# Patient Record
Sex: FEMALE | Race: BLACK OR AFRICAN AMERICAN
[De-identification: names, ages, dates, MRNs, and addresses within clinical notes are randomized per-mention and may not be internally consistent; named-entity substitution may affect disease eponyms.]

---

## 2018-06-08 ENCOUNTER — HOSPITAL ENCOUNTER (EMERGENCY)
Dept: HOSPITAL 76 - ED | Age: 28
Discharge: HOME | End: 2018-06-08
Payer: COMMERCIAL

## 2018-06-08 VITALS — DIASTOLIC BLOOD PRESSURE: 75 MMHG | SYSTOLIC BLOOD PRESSURE: 104 MMHG

## 2018-06-08 DIAGNOSIS — L02.215: Primary | ICD-10-CM

## 2018-06-08 PROCEDURE — 99283 EMERGENCY DEPT VISIT LOW MDM: CPT

## 2018-06-08 NOTE — ED PHYSICIAN DOCUMENTATION
PD HPI SKIN





- Stated complaint


Stated Complaint: SORE ON BOTTOM





- Chief complaint


Chief Complaint: Wound





- History obtained from


History obtained from: Patient





- History of Present Illness


Timing - onset: Yesterday


Timing - details: Gradual onset


Location: Other (perineum)


Quality / character: Painful


Similar symptoms before: Diagnosis (Staph infection (boil).)





- Additional information


Additional information: 


The patient is a 28-year-old female who presents with a sore in her perineal 

region.  She first noticed it yesterday, and it become worse today.  She has a 

history of staph skin infections, which she states have been tested in the past 

and have been negative for MRSA.  She denies fever, dysuria, or abdominal 

symptoms.








Review of Systems


Constitutional: denies: Fever


Nose: denies: Congestion


Throat: denies: Sore throat


Respiratory: denies: Dyspnea


GI: denies: Abdominal Pain, Nausea, Vomiting


: denies: Dysuria


Skin: reports: Lesions (Right perineum).  denies: Rash


Musculoskeletal: denies: Back pain





PD PAST MEDICAL HISTORY





- Past Medical History


Past Medical History: No





- Past Surgical History


Past Surgical History: Yes


/GYN:  section





- Present Medications


Home Medications: 


 Ambulatory Orders











 Medication  Instructions  Recorded  Confirmed


 


Fluconazole [Diflucan] 150 mg PO ONCE #1 tablet 18 


 


cephALEXin [Cephalexin] 500 mg PO QID #28 tablet 18 














- Allergies


Allergies/Adverse Reactions: 


 Allergies











Allergy/AdvReac Type Severity Reaction Status Date / Time


 


No Known Drug Allergies Allergy   Verified 18 15:21














- Social History


Does the pt smoke?: No


Smoking Status: Never smoker





- Immunizations


Immunizations are current?: Yes





PD ED PE NORMAL





- Vitals


Vital signs reviewed: Yes (Initially hypertensive.)





- General


General: Alert and oriented X 3, Well developed/nourished, Other (Obese.)





- HEENT


HEENT: Atraumatic





- Respiratory


Respiratory: No respiratory distress





- Derm


Derm: Other (There is a small mildly raised, tender to palpation mass at the 

right perineum in the gluteal region between the vaginal labia and the anal 

crease.  There is no fluctuance, and no central protrusion.  It is mildly 

erythematous.)





- Neuro


Neuro: Alert and oriented X 3





Results





- Vitals


Vitals: 


 Oxygen











O2 Source                      Room air

















PD MEDICAL DECISION MAKING





- ED course


Complexity details: considered differential, d/w patient


ED course: 


The patient's presentation is significant for an early abscess in the perineal 

region.  It is quite small, without fluctuance, and does not warrant incision 

and drainage in its current state.  Treatment in the emergency department 

included administration of cephalexin 500 mg orally.  She is being discharged 

with prescription for cephalexin.  I discussed with her antibiotic treatment, 

the expected course of illness, as well as potentially worrisome signs or 

symptoms that should prompt reevaluation in the emergency department.








- Sepsis Event


Vital Signs: 


 Oxygen











O2 Source                      Room air

















Departure





- Departure


Disposition:  Home, Self Care


Clinical Impression: 


 Abscess





Condition: Stable


Instructions:  ED Staph Infec Abx Tx Only


Prescriptions: 


cephALEXin [Cephalexin] 500 mg PO QID #28 tablet


Fluconazole [Diflucan] 150 mg PO ONCE #1 tablet


Comments: 


Apply hot soaks to the sore area intermittently for the next 3 days.  Clean the 

area with warm soapy water at least twice daily.


Take cephalexin 4 times daily as prescribed.


You can use Tylenol or ibuprofen if needed for discomfort.


Follow up with your primary physician within 1-2 weeks.  Call to schedule 

appointment.


Return to the emergency department if you develop increasing swelling or pain, 

or otherwise worsening symptoms.


Forms:  Activity restrictions


Discharge Date/Time: 18 15:57